# Patient Record
Sex: MALE | Race: WHITE | NOT HISPANIC OR LATINO
[De-identification: names, ages, dates, MRNs, and addresses within clinical notes are randomized per-mention and may not be internally consistent; named-entity substitution may affect disease eponyms.]

---

## 2017-02-14 ENCOUNTER — APPOINTMENT (OUTPATIENT)
Dept: ORTHOPEDIC SURGERY | Facility: CLINIC | Age: 67
End: 2017-02-14

## 2017-02-14 DIAGNOSIS — L84 CORNS AND CALLOSITIES: ICD-10-CM

## 2017-02-15 ENCOUNTER — FORM ENCOUNTER (OUTPATIENT)
Age: 67
End: 2017-02-15

## 2017-02-16 ENCOUNTER — OUTPATIENT (OUTPATIENT)
Dept: OUTPATIENT SERVICES | Facility: HOSPITAL | Age: 67
LOS: 1 days | End: 2017-02-16
Payer: COMMERCIAL

## 2017-02-16 DIAGNOSIS — Z98.89 OTHER SPECIFIED POSTPROCEDURAL STATES: Chronic | ICD-10-CM

## 2017-02-16 PROCEDURE — 20611 DRAIN/INJ JOINT/BURSA W/US: CPT

## 2017-02-16 PROCEDURE — 20611 DRAIN/INJ JOINT/BURSA W/US: CPT | Mod: LT

## 2017-02-16 PROCEDURE — 76882 US LMTD JT/FCL EVL NVASC XTR: CPT

## 2017-02-16 PROCEDURE — 76882 US LMTD JT/FCL EVL NVASC XTR: CPT | Mod: 26,59,LT

## 2017-02-21 ENCOUNTER — OTHER (OUTPATIENT)
Age: 67
End: 2017-02-21

## 2017-02-22 ENCOUNTER — LABORATORY RESULT (OUTPATIENT)
Age: 67
End: 2017-02-22

## 2017-05-05 ENCOUNTER — MEDICATION RENEWAL (OUTPATIENT)
Age: 67
End: 2017-05-05

## 2017-06-27 DIAGNOSIS — J06.9 ACUTE UPPER RESPIRATORY INFECTION, UNSPECIFIED: ICD-10-CM

## 2017-09-05 ENCOUNTER — APPOINTMENT (OUTPATIENT)
Dept: ORTHOPEDIC SURGERY | Facility: CLINIC | Age: 67
End: 2017-09-05
Payer: COMMERCIAL

## 2017-09-05 VITALS — BODY MASS INDEX: 27.4 KG/M2 | RESPIRATION RATE: 16 BRPM | WEIGHT: 225 LBS | HEIGHT: 76 IN

## 2017-09-05 DIAGNOSIS — L40.50 ARTHROPATHIC PSORIASIS, UNSPECIFIED: ICD-10-CM

## 2017-09-05 PROCEDURE — 99214 OFFICE O/P EST MOD 30 MIN: CPT

## 2017-09-05 PROCEDURE — 73130 X-RAY EXAM OF HAND: CPT | Mod: RT

## 2017-09-13 ENCOUNTER — APPOINTMENT (OUTPATIENT)
Dept: ORTHOPEDIC SURGERY | Facility: CLINIC | Age: 67
End: 2017-09-13
Payer: COMMERCIAL

## 2017-09-13 PROCEDURE — 99214 OFFICE O/P EST MOD 30 MIN: CPT | Mod: 25

## 2017-09-13 PROCEDURE — 20600 DRAIN/INJ JOINT/BURSA W/O US: CPT | Mod: RT

## 2017-10-04 ENCOUNTER — MEDICATION RENEWAL (OUTPATIENT)
Age: 67
End: 2017-10-04

## 2017-10-10 ENCOUNTER — OUTPATIENT (OUTPATIENT)
Dept: OUTPATIENT SERVICES | Facility: HOSPITAL | Age: 67
LOS: 1 days | End: 2017-10-10
Payer: COMMERCIAL

## 2017-10-10 DIAGNOSIS — Z98.89 OTHER SPECIFIED POSTPROCEDURAL STATES: Chronic | ICD-10-CM

## 2017-10-10 DIAGNOSIS — M54.2 CERVICALGIA: ICD-10-CM

## 2017-10-10 PROCEDURE — 72050 X-RAY EXAM NECK SPINE 4/5VWS: CPT | Mod: 26

## 2017-10-10 PROCEDURE — 72050 X-RAY EXAM NECK SPINE 4/5VWS: CPT

## 2017-10-11 ENCOUNTER — MEDICATION RENEWAL (OUTPATIENT)
Age: 67
End: 2017-10-11

## 2017-10-11 ENCOUNTER — FORM ENCOUNTER (OUTPATIENT)
Age: 67
End: 2017-10-11

## 2017-10-12 ENCOUNTER — OUTPATIENT (OUTPATIENT)
Dept: OUTPATIENT SERVICES | Facility: HOSPITAL | Age: 67
LOS: 1 days | End: 2017-10-12
Payer: COMMERCIAL

## 2017-10-12 DIAGNOSIS — Z98.89 OTHER SPECIFIED POSTPROCEDURAL STATES: Chronic | ICD-10-CM

## 2017-10-12 PROCEDURE — 72141 MRI NECK SPINE W/O DYE: CPT | Mod: 26

## 2017-10-12 PROCEDURE — 72141 MRI NECK SPINE W/O DYE: CPT

## 2017-11-19 ENCOUNTER — FORM ENCOUNTER (OUTPATIENT)
Age: 67
End: 2017-11-19

## 2017-11-20 ENCOUNTER — OUTPATIENT (OUTPATIENT)
Dept: OUTPATIENT SERVICES | Facility: HOSPITAL | Age: 67
LOS: 1 days | End: 2017-11-20
Payer: COMMERCIAL

## 2017-11-20 DIAGNOSIS — J18.9 PNEUMONIA, UNSPECIFIED ORGANISM: ICD-10-CM

## 2017-11-20 DIAGNOSIS — Z98.89 OTHER SPECIFIED POSTPROCEDURAL STATES: Chronic | ICD-10-CM

## 2017-11-20 PROCEDURE — 71046 X-RAY EXAM CHEST 2 VIEWS: CPT

## 2017-11-20 PROCEDURE — 71020: CPT | Mod: 26

## 2017-11-20 RX ORDER — AZITHROMYCIN 250 MG/1
250 TABLET, FILM COATED ORAL
Qty: 1 | Refills: 1 | Status: DISCONTINUED | COMMUNITY
Start: 2017-06-27 | End: 2017-11-20

## 2017-12-04 ENCOUNTER — FORM ENCOUNTER (OUTPATIENT)
Age: 67
End: 2017-12-04

## 2017-12-05 ENCOUNTER — OUTPATIENT (OUTPATIENT)
Dept: OUTPATIENT SERVICES | Facility: HOSPITAL | Age: 67
LOS: 1 days | End: 2017-12-05
Payer: COMMERCIAL

## 2017-12-05 DIAGNOSIS — Z98.89 OTHER SPECIFIED POSTPROCEDURAL STATES: Chronic | ICD-10-CM

## 2017-12-05 PROCEDURE — 71046 X-RAY EXAM CHEST 2 VIEWS: CPT

## 2017-12-05 PROCEDURE — 71020: CPT | Mod: 26

## 2017-12-19 ENCOUNTER — MEDICATION RENEWAL (OUTPATIENT)
Age: 67
End: 2017-12-19

## 2017-12-19 RX ORDER — LEVOFLOXACIN 500 MG/1
500 TABLET, FILM COATED ORAL DAILY
Qty: 10 | Refills: 0 | Status: DISCONTINUED | COMMUNITY
Start: 2017-11-20 | End: 2017-12-19

## 2018-01-17 ENCOUNTER — FORM ENCOUNTER (OUTPATIENT)
Age: 68
End: 2018-01-17

## 2018-01-18 ENCOUNTER — OUTPATIENT (OUTPATIENT)
Dept: OUTPATIENT SERVICES | Facility: HOSPITAL | Age: 68
LOS: 1 days | End: 2018-01-18
Payer: COMMERCIAL

## 2018-01-18 DIAGNOSIS — Z98.89 OTHER SPECIFIED POSTPROCEDURAL STATES: Chronic | ICD-10-CM

## 2018-01-18 PROCEDURE — 71250 CT THORAX DX C-: CPT | Mod: 26

## 2018-01-18 PROCEDURE — 71250 CT THORAX DX C-: CPT

## 2018-03-19 ENCOUNTER — APPOINTMENT (OUTPATIENT)
Dept: ORTHOPEDIC SURGERY | Facility: CLINIC | Age: 68
End: 2018-03-19
Payer: COMMERCIAL

## 2018-03-19 DIAGNOSIS — R22.31 LOCALIZED SWELLING, MASS AND LUMP, RIGHT UPPER LIMB: ICD-10-CM

## 2018-03-19 DIAGNOSIS — M79.645 PAIN IN LEFT FINGER(S): ICD-10-CM

## 2018-03-19 DIAGNOSIS — G89.29 PAIN IN LEFT FINGER(S): ICD-10-CM

## 2018-03-19 DIAGNOSIS — M79.644 PAIN IN RIGHT FINGER(S): ICD-10-CM

## 2018-03-19 PROCEDURE — 73140 X-RAY EXAM OF FINGER(S): CPT | Mod: FA,F9,F5

## 2018-03-19 PROCEDURE — 76882 US LMTD JT/FCL EVL NVASC XTR: CPT | Mod: RT

## 2018-03-19 PROCEDURE — 99214 OFFICE O/P EST MOD 30 MIN: CPT

## 2018-03-22 ENCOUNTER — MEDICATION RENEWAL (OUTPATIENT)
Age: 68
End: 2018-03-22

## 2018-04-05 ENCOUNTER — FORM ENCOUNTER (OUTPATIENT)
Age: 68
End: 2018-04-05

## 2018-04-05 DIAGNOSIS — R51 HEADACHE: ICD-10-CM

## 2018-04-06 ENCOUNTER — OUTPATIENT (OUTPATIENT)
Dept: OUTPATIENT SERVICES | Facility: HOSPITAL | Age: 68
LOS: 1 days | End: 2018-04-06
Payer: COMMERCIAL

## 2018-04-06 ENCOUNTER — APPOINTMENT (OUTPATIENT)
Dept: CT IMAGING | Facility: HOSPITAL | Age: 68
End: 2018-04-06
Payer: COMMERCIAL

## 2018-04-06 DIAGNOSIS — Z98.89 OTHER SPECIFIED POSTPROCEDURAL STATES: Chronic | ICD-10-CM

## 2018-04-06 PROCEDURE — 70486 CT MAXILLOFACIAL W/O DYE: CPT | Mod: 26

## 2018-04-06 PROCEDURE — 70486 CT MAXILLOFACIAL W/O DYE: CPT

## 2018-05-09 RX ORDER — AZITHROMYCIN 250 MG/1
250 TABLET, FILM COATED ORAL
Qty: 1 | Refills: 1 | Status: DISCONTINUED | COMMUNITY
Start: 2017-12-19 | End: 2018-05-09

## 2018-07-22 ENCOUNTER — FORM ENCOUNTER (OUTPATIENT)
Age: 68
End: 2018-07-22

## 2018-07-23 ENCOUNTER — OUTPATIENT (OUTPATIENT)
Dept: OUTPATIENT SERVICES | Facility: HOSPITAL | Age: 68
LOS: 1 days | End: 2018-07-23
Payer: COMMERCIAL

## 2018-07-23 ENCOUNTER — FORM ENCOUNTER (OUTPATIENT)
Age: 68
End: 2018-07-23

## 2018-07-23 DIAGNOSIS — Z98.89 OTHER SPECIFIED POSTPROCEDURAL STATES: Chronic | ICD-10-CM

## 2018-07-23 PROCEDURE — 70336 MAGNETIC IMAGE JAW JOINT: CPT

## 2018-07-23 PROCEDURE — 70336 MAGNETIC IMAGE JAW JOINT: CPT | Mod: 26

## 2018-07-24 ENCOUNTER — FORM ENCOUNTER (OUTPATIENT)
Age: 68
End: 2018-07-24

## 2018-07-24 ENCOUNTER — OUTPATIENT (OUTPATIENT)
Dept: OUTPATIENT SERVICES | Facility: HOSPITAL | Age: 68
LOS: 1 days | End: 2018-07-24
Payer: COMMERCIAL

## 2018-07-24 DIAGNOSIS — R22.1 LOCALIZED SWELLING, MASS AND LUMP, NECK: ICD-10-CM

## 2018-07-24 DIAGNOSIS — Z98.89 OTHER SPECIFIED POSTPROCEDURAL STATES: Chronic | ICD-10-CM

## 2018-07-24 DIAGNOSIS — K11.9 DISEASE OF SALIVARY GLAND, UNSPECIFIED: ICD-10-CM

## 2018-07-24 PROCEDURE — 70542 MRI ORBIT/FACE/NECK W/DYE: CPT | Mod: 26

## 2018-07-24 PROCEDURE — A9585: CPT

## 2018-07-24 PROCEDURE — 70542 MRI ORBIT/FACE/NECK W/DYE: CPT

## 2018-07-25 ENCOUNTER — RESULT REVIEW (OUTPATIENT)
Age: 68
End: 2018-07-25

## 2018-07-25 ENCOUNTER — OUTPATIENT (OUTPATIENT)
Dept: OUTPATIENT SERVICES | Facility: HOSPITAL | Age: 68
LOS: 1 days | End: 2018-07-25
Payer: COMMERCIAL

## 2018-07-25 ENCOUNTER — FORM ENCOUNTER (OUTPATIENT)
Age: 68
End: 2018-07-25

## 2018-07-25 ENCOUNTER — MEDICATION RENEWAL (OUTPATIENT)
Age: 68
End: 2018-07-25

## 2018-07-25 DIAGNOSIS — Z98.89 OTHER SPECIFIED POSTPROCEDURAL STATES: Chronic | ICD-10-CM

## 2018-07-25 PROCEDURE — 88305 TISSUE EXAM BY PATHOLOGIST: CPT

## 2018-07-25 PROCEDURE — 76942 ECHO GUIDE FOR BIOPSY: CPT | Mod: 26

## 2018-07-25 PROCEDURE — 76942 ECHO GUIDE FOR BIOPSY: CPT

## 2018-07-25 PROCEDURE — 88342 IMHCHEM/IMCYTCHM 1ST ANTB: CPT

## 2018-07-25 PROCEDURE — 10022: CPT

## 2018-07-25 PROCEDURE — 10022: CPT | Mod: LT,59

## 2018-07-25 PROCEDURE — 88173 CYTOPATH EVAL FNA REPORT: CPT

## 2018-07-25 PROCEDURE — 88360 TUMOR IMMUNOHISTOCHEM/MANUAL: CPT

## 2018-07-25 PROCEDURE — 88341 IMHCHEM/IMCYTCHM EA ADD ANTB: CPT

## 2018-07-26 ENCOUNTER — OUTPATIENT (OUTPATIENT)
Dept: OUTPATIENT SERVICES | Facility: HOSPITAL | Age: 68
LOS: 1 days | End: 2018-07-26
Payer: COMMERCIAL

## 2018-07-26 DIAGNOSIS — Z98.89 OTHER SPECIFIED POSTPROCEDURAL STATES: Chronic | ICD-10-CM

## 2018-07-26 LAB
GLUCOSE BLDC GLUCOMTR-MCNC: 103 MG/DL — HIGH (ref 70–99)
NON-GYNECOLOGICAL CYTOLOGY STUDY: SIGNIFICANT CHANGE UP

## 2018-07-26 PROCEDURE — 78815 PET IMAGE W/CT SKULL-THIGH: CPT

## 2018-07-26 PROCEDURE — 78815 PET IMAGE W/CT SKULL-THIGH: CPT | Mod: 26

## 2018-07-26 PROCEDURE — A9552: CPT

## 2018-07-26 PROCEDURE — 82962 GLUCOSE BLOOD TEST: CPT

## 2018-08-08 LAB — NON-GYNECOLOGICAL CYTOLOGY STUDY: SIGNIFICANT CHANGE UP

## 2018-08-31 DIAGNOSIS — R53.81 OTHER MALAISE: ICD-10-CM

## 2018-09-07 ENCOUNTER — APPOINTMENT (OUTPATIENT)
Dept: PULMONOLOGY | Facility: CLINIC | Age: 68
End: 2018-09-07
Payer: COMMERCIAL

## 2018-09-07 VITALS
SYSTOLIC BLOOD PRESSURE: 140 MMHG | DIASTOLIC BLOOD PRESSURE: 70 MMHG | BODY MASS INDEX: 26.54 KG/M2 | HEART RATE: 106 BPM | TEMPERATURE: 98.2 F | OXYGEN SATURATION: 98 % | WEIGHT: 218 LBS

## 2018-09-07 DIAGNOSIS — Z00.00 ENCOUNTER FOR GENERAL ADULT MEDICAL EXAMINATION W/OUT ABNORMAL FINDINGS: ICD-10-CM

## 2018-09-07 DIAGNOSIS — R91.8 OTHER NONSPECIFIC ABNORMAL FINDING OF LUNG FIELD: ICD-10-CM

## 2018-09-07 PROCEDURE — 99215 OFFICE O/P EST HI 40 MIN: CPT | Mod: 25

## 2018-09-07 PROCEDURE — 36415 COLL VENOUS BLD VENIPUNCTURE: CPT

## 2018-09-08 LAB
25(OH)D3 SERPL-MCNC: 21.9 NG/ML
ALBUMIN SERPL ELPH-MCNC: 4.3 G/DL
ALP BLD-CCNC: 76 U/L
ALT SERPL-CCNC: 14 U/L
ANION GAP SERPL CALC-SCNC: 18 MMOL/L
AST SERPL-CCNC: 13 U/L
BASOPHILS # BLD AUTO: 0.03 K/UL
BASOPHILS NFR BLD AUTO: 0.4 %
BILIRUB SERPL-MCNC: 0.4 MG/DL
BUN SERPL-MCNC: 14 MG/DL
CALCIUM SERPL-MCNC: 9.6 MG/DL
CHLORIDE SERPL-SCNC: 101 MMOL/L
CHOLEST SERPL-MCNC: 154 MG/DL
CHOLEST/HDLC SERPL: 2.6 RATIO
CO2 SERPL-SCNC: 22 MMOL/L
CREAT SERPL-MCNC: 1.03 MG/DL
EOSINOPHIL # BLD AUTO: 0.1 K/UL
EOSINOPHIL NFR BLD AUTO: 1.4 %
GLUCOSE SERPL-MCNC: 125 MG/DL
HBA1C MFR BLD HPLC: 4.9 %
HCT VFR BLD CALC: 39.3 %
HDLC SERPL-MCNC: 60 MG/DL
HGB BLD-MCNC: 12.2 G/DL
IMM GRANULOCYTES NFR BLD AUTO: 0.1 %
LDLC SERPL CALC-MCNC: 79 MG/DL
LYMPHOCYTES # BLD AUTO: 1.17 K/UL
LYMPHOCYTES NFR BLD AUTO: 16.8 %
MAN DIFF?: NORMAL
MCHC RBC-ENTMCNC: 29.9 PG
MCHC RBC-ENTMCNC: 31 GM/DL
MCV RBC AUTO: 96.3 FL
MONOCYTES # BLD AUTO: 0.45 K/UL
MONOCYTES NFR BLD AUTO: 6.5 %
NEUTROPHILS # BLD AUTO: 5.19 K/UL
NEUTROPHILS NFR BLD AUTO: 74.8 %
PLATELET # BLD AUTO: 425 K/UL
POTASSIUM SERPL-SCNC: 3.9 MMOL/L
PROT SERPL-MCNC: 6.7 G/DL
PSA SERPL-MCNC: 3.79 NG/ML
RBC # BLD: 4.08 M/UL
RBC # FLD: 13.8 %
SODIUM SERPL-SCNC: 141 MMOL/L
T3 SERPL-MCNC: 96 NG/DL
T4 SERPL-MCNC: 7.6 UG/DL
TRIGL SERPL-MCNC: 75 MG/DL
TSH SERPL-ACNC: 0.79 UIU/ML
WBC # FLD AUTO: 6.95 K/UL

## 2018-09-10 NOTE — HISTORY OF PRESENT ILLNESS
[FreeTextEntry1] : His following after his surgery. [de-identified] : Is doing well. He's walking around without shortness of breath. His feeling back pain in right lower rib cage most likely happen after he lifts heavy objects. He's eating well. Has no problem swallowing. Is not wheezing.

## 2018-09-10 NOTE — ASSESSMENT
[FreeTextEntry1] : Left parotid lymphoepithelial tumor\par The patient underwent left parotid resection with reconstruction surgery including the implant of the left facial nerve.  Patient develop hematoma at the site off the right thigh graft. Patient have dwelling catheter. The patient is scheduled for radiation therapy on the neck followed by chemotherapy.\par \par Chest pain\par \par Is musculoskeletal. Patient had a recent venous Doppler which was negative. The patient is more with ambulation and is improving.\par \par Hypertension\par \par Continue on antihypertensive medication. The blood pressure is controlled.\par \par Sinus tachycardia\par \par It's improving and most likely related to the postoperative course.\par \par Walden's granulomatosis\par \par Follow up with oncology

## 2018-09-10 NOTE — HEALTH RISK ASSESSMENT
[] : No [Two or more falls in past year] : Patient reported two or more falls in the past year [0] : 1) Little interest or pleasure doing things: Not at all (0)

## 2018-09-14 ENCOUNTER — MEDICATION RENEWAL (OUTPATIENT)
Age: 68
End: 2018-09-14

## 2018-11-13 ENCOUNTER — RX CHANGE (OUTPATIENT)
Age: 68
End: 2018-11-13

## 2018-11-13 DIAGNOSIS — G89.18 OTHER ACUTE POSTPROCEDURAL PAIN: ICD-10-CM

## 2018-11-14 ENCOUNTER — RX CHANGE (OUTPATIENT)
Age: 68
End: 2018-11-14

## 2018-11-16 ENCOUNTER — RX RENEWAL (OUTPATIENT)
Age: 68
End: 2018-11-16

## 2018-11-16 ENCOUNTER — APPOINTMENT (OUTPATIENT)
Dept: OTOLARYNGOLOGY | Facility: CLINIC | Age: 68
End: 2018-11-16
Payer: COMMERCIAL

## 2018-11-16 DIAGNOSIS — R68.84 JAW PAIN: ICD-10-CM

## 2018-11-16 PROCEDURE — 99203 OFFICE O/P NEW LOW 30 MIN: CPT

## 2018-11-19 ENCOUNTER — APPOINTMENT (OUTPATIENT)
Dept: NEPHROLOGY | Facility: CLINIC | Age: 68
End: 2018-11-19
Payer: COMMERCIAL

## 2018-11-19 VITALS
WEIGHT: 206 LBS | HEART RATE: 88 BPM | BODY MASS INDEX: 25.08 KG/M2 | SYSTOLIC BLOOD PRESSURE: 168 MMHG | DIASTOLIC BLOOD PRESSURE: 84 MMHG

## 2018-11-19 PROBLEM — R68.84 PAIN IN MANDIBLE: Status: ACTIVE | Noted: 2018-11-16

## 2018-11-19 PROCEDURE — 99204 OFFICE O/P NEW MOD 45 MIN: CPT | Mod: 25

## 2018-11-19 PROCEDURE — 93784 AMBL BP MNTR W/SOFTWARE: CPT

## 2018-11-21 ENCOUNTER — APPOINTMENT (OUTPATIENT)
Dept: INFECTIOUS DISEASE | Facility: CLINIC | Age: 68
End: 2018-11-21
Payer: COMMERCIAL

## 2018-11-21 VITALS
BODY MASS INDEX: 25.43 KG/M2 | HEART RATE: 96 BPM | RESPIRATION RATE: 15 BRPM | SYSTOLIC BLOOD PRESSURE: 166 MMHG | OXYGEN SATURATION: 100 % | DIASTOLIC BLOOD PRESSURE: 81 MMHG | TEMPERATURE: 98.1 F | WEIGHT: 211 LBS | HEIGHT: 76.5 IN

## 2018-11-21 DIAGNOSIS — M31.30 WEGENER'S GRANULOMATOSIS W/OUT RENAL INVOLVEMENT: ICD-10-CM

## 2018-11-21 DIAGNOSIS — K04.7 PERIAPICAL ABSCESS W/OUT SINUS: ICD-10-CM

## 2018-11-21 DIAGNOSIS — N17.9 ACUTE KIDNEY FAILURE, UNSPECIFIED: ICD-10-CM

## 2018-11-21 PROCEDURE — 99204 OFFICE O/P NEW MOD 45 MIN: CPT

## 2018-11-21 RX ORDER — OXYCODONE AND ACETAMINOPHEN 5; 325 MG/1; MG/1
5-325 TABLET ORAL EVERY 6 HOURS
Qty: 64 | Refills: 0 | Status: COMPLETED | COMMUNITY
Start: 2018-11-13 | End: 2018-11-21

## 2018-11-26 ENCOUNTER — MEDICATION RENEWAL (OUTPATIENT)
Age: 68
End: 2018-11-26

## 2018-11-26 LAB
25(OH)D3 SERPL-MCNC: 22.7 NG/ML
ALBUMIN SERPL ELPH-MCNC: 4.7 G/DL
ALDOSTERONE SERUM: 18.5 NG/DL
ALP BLD-CCNC: 68 U/L
ALT SERPL-CCNC: 15 U/L
ANION GAP SERPL CALC-SCNC: 13 MMOL/L
APPEARANCE: CLEAR
AST SERPL-CCNC: 14 U/L
BACTERIA: NEGATIVE
BASOPHILS # BLD AUTO: 0.03 K/UL
BASOPHILS NFR BLD AUTO: 0.4 %
BILIRUB SERPL-MCNC: 0.3 MG/DL
BILIRUBIN URINE: NEGATIVE
BLOOD URINE: NEGATIVE
BUN SERPL-MCNC: 15 MG/DL
CALCIUM SERPL-MCNC: 9.7 MG/DL
CALCIUM SERPL-MCNC: 9.7 MG/DL
CHLORIDE SERPL-SCNC: 100 MMOL/L
CO2 SERPL-SCNC: 27 MMOL/L
COLOR: YELLOW
CREAT SERPL-MCNC: 1.25 MG/DL
CREAT SPEC-SCNC: 48 MG/DL
CREAT/PROT UR: 0.1 RATIO
EOSINOPHIL # BLD AUTO: 0.05 K/UL
EOSINOPHIL NFR BLD AUTO: 0.7 %
GLUCOSE QUALITATIVE U: NEGATIVE MG/DL
GLUCOSE SERPL-MCNC: 97 MG/DL
HCT VFR BLD CALC: 37.5 %
HGB BLD-MCNC: 12 G/DL
IMM GRANULOCYTES NFR BLD AUTO: 0.1 %
KETONES URINE: NEGATIVE
LEUKOCYTE ESTERASE URINE: NEGATIVE
LYMPHOCYTES # BLD AUTO: 0.89 K/UL
LYMPHOCYTES NFR BLD AUTO: 12.7 %
MAGNESIUM SERPL-MCNC: 2 MG/DL
MAN DIFF?: NORMAL
MCHC RBC-ENTMCNC: 27.8 PG
MCHC RBC-ENTMCNC: 32 GM/DL
MCV RBC AUTO: 87 FL
MICROSCOPIC-UA: NORMAL
MONOCYTES # BLD AUTO: 0.57 K/UL
MONOCYTES NFR BLD AUTO: 8.2 %
NEUTROPHILS # BLD AUTO: 5.44 K/UL
NEUTROPHILS NFR BLD AUTO: 77.9 %
NITRITE URINE: NEGATIVE
PARATHYROID HORMONE INTACT: 54 PG/ML
PH URINE: 6
PHOSPHATE SERPL-MCNC: 4.4 MG/DL
PLATELET # BLD AUTO: 315 K/UL
POTASSIUM SERPL-SCNC: 4 MMOL/L
PROT SERPL-MCNC: 7.1 G/DL
PROT UR-MCNC: 5 MG/DL
PROTEIN URINE: NEGATIVE MG/DL
RBC # BLD: 4.31 M/UL
RBC # FLD: 14.9 %
RED BLOOD CELLS URINE: 0 /HPF
SODIUM SERPL-SCNC: 140 MMOL/L
SPECIFIC GRAVITY URINE: 1.01
SQUAMOUS EPITHELIAL CELLS: 0 /HPF
T4 FREE SERPL-MCNC: 1.4 NG/DL
TSH SERPL-ACNC: 0.47 UIU/ML
URATE SERPL-MCNC: 6.7 MG/DL
UROBILINOGEN URINE: NEGATIVE MG/DL
WBC # FLD AUTO: 6.99 K/UL
WHITE BLOOD CELLS URINE: 0 /HPF

## 2018-12-02 LAB — RENIN ACTIVITY, PLASMA: 1.68 NG/ML/HR

## 2019-01-24 ENCOUNTER — APPOINTMENT (OUTPATIENT)
Dept: ORTHOPEDIC SURGERY | Facility: CLINIC | Age: 69
End: 2019-01-24
Payer: COMMERCIAL

## 2019-01-24 DIAGNOSIS — M67.911 UNSPECIFIED DISORDER OF SYNOVIUM AND TENDON, RIGHT SHOULDER: ICD-10-CM

## 2019-01-24 DIAGNOSIS — M75.41 IMPINGEMENT SYNDROME OF RIGHT SHOULDER: ICD-10-CM

## 2019-01-24 PROCEDURE — 73030 X-RAY EXAM OF SHOULDER: CPT | Mod: RT

## 2019-01-24 PROCEDURE — 99214 OFFICE O/P EST MOD 30 MIN: CPT

## 2019-01-24 NOTE — ASSESSMENT
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 He he should not be taking many NSAIDs now because recently his creatinine had been elevated related to chemotherapy for the parotid carcinoma.\par He can take Tylenol if needed for pain. I suggested going for diagnostic ultrasound to assess the rotator cuff and at the same time an aspiration/needling of the calcific density can be performed with a corticosteroid injection. Hopefully this will give him pain relief. He can work on the range of motion doing assisted motion and stretching of the shoulder and gentle strengthening around the shoulder. No overhead strengthening such as shoulder press and no bench press.\par Heat and ice as needed.\par If the pain is not better following the ultrasound injection then I may have him get an MRI to assess further and he should followup with the MRI.

## 2019-01-24 NOTE — HISTORY OF PRESENT ILLNESS
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 He was seen 2 years ago with LEFT shoulder pain which got better with corticosteroid injection.\par He developed RIGHT shoulder pain several mos ago without any injury or inciting event.\par He had been guarding it because it wasn't that severe and then he had a LEFT parotid carcinoma which required surgery and radiation therapy. He is now done with all of the treatment and recovering well..\par the shoulder pain is intermittent but consistent when lifts his arm and does a Bowen type of impingement movement. During surgery holding his arm out to the side can be painful.\par He sleeps on the right side may aggravate it . He wakes with pain.\par His creatinine had been elevated so he can't take NSAIDs. He hasn't been taking Tylenol or medication for the pain because he gets relief with his arm at the side.\par He has a history of calcific tendinitis in the LEFT shoulder treated with surgery many years ago which did well. He had 2 corticosteroid injections done prior to the surgery.

## 2019-01-24 NOTE — PHYSICAL EXAM
[de-identified] : Right  shoulder w/ left for comparison:\par Normal appearance. \par AROM: 170 FE, IR to T 11 vs T9, 170 abd.\par PROM: 170 FE, 60 ER at the side vs 70, 80 ER and 0 IR vs 60 in the 90 degree abducted position.\par Motor:  5/5  supraspinatus,  5/5 ER, 5/5 IR, 5/5 biceps, 5/5 deltoid.  Normal lift off test\par + Neer,+  Bowen. -  X-arm.   - [place]'s, + Speeds.\par Tender over the biceps tendon and the anterior shoulder.  \par Laxity: normal.\par No scapular winging.\par Sensation is intact distally in the UE.\par Skin is intact in the UE. \par Intact Motor distally.\par  [de-identified] : LEFT facial neuropraxia [de-identified] : \par X-rays of the RIGHT shoulder today 3 views show 2 large oval calcific densities just superior to the greater tuberosity in the region of the supraspinatus tendon consistent with a calcific tendinitis. Very small glenohumeral spur without significant osteoarthritis. No bone lesions.

## 2019-02-06 ENCOUNTER — APPOINTMENT (OUTPATIENT)
Dept: NEUROLOGY | Facility: CLINIC | Age: 69
End: 2019-02-06
Payer: COMMERCIAL

## 2019-02-06 VITALS
HEART RATE: 74 BPM | TEMPERATURE: 97.9 F | SYSTOLIC BLOOD PRESSURE: 153 MMHG | BODY MASS INDEX: 25.43 KG/M2 | DIASTOLIC BLOOD PRESSURE: 84 MMHG | HEIGHT: 76.5 IN | OXYGEN SATURATION: 100 % | WEIGHT: 211 LBS

## 2019-02-06 DIAGNOSIS — M25.522 PAIN IN LEFT ELBOW: ICD-10-CM

## 2019-02-06 DIAGNOSIS — G56.22 LESION OF ULNAR NERVE, LEFT UPPER LIMB: ICD-10-CM

## 2019-02-06 DIAGNOSIS — R20.0 ANESTHESIA OF SKIN: ICD-10-CM

## 2019-02-06 PROCEDURE — 99203 OFFICE O/P NEW LOW 30 MIN: CPT

## 2019-02-06 PROCEDURE — 76882 US LMTD JT/FCL EVL NVASC XTR: CPT | Mod: LT

## 2019-02-06 PROCEDURE — 95911 NRV CNDJ TEST 9-10 STUDIES: CPT

## 2019-02-06 NOTE — PROCEDURE
[FreeTextEntry1] : Nerve Conduction, Electromyography and Neuromuscular Ultrasound Report [FreeTextEntry3] : Electro Physiologic Findings:\par \par Limb temperature was monitored and maintained at approximately 32 – 36° C in the upper extremities.\par \par The median distal sensory short segments had mildly reduced conduction velocity; otherwise the median sensory and motor responses were unremarkable without slowing across the wrists. The left ulnar sensory response was mildly slow with normal amplitude; otherwise the ulnar sensory and motor responses were also normal; there was no motor slowing or conduction block across the elbows. 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 There was no definite median or ulnar nerve entrapment on either side. However, the left ulnar nerve was significantly enlarged at the elbow, with a hypoechoic appearance and reduced fascicular pattern, indicative of mild entrapment. \par

## 2019-02-06 NOTE — HISTORY OF PRESENT ILLNESS
[FreeTextEntry1] : 67 yo M with hand numbness for the past 3-4 months, after starting platinum-based chemotherapy for head and neck cancer. He's had occasional "sand-paper" feeling in the hands in the past, especially pinky fingers when his elbows are flexed, no motor symptoms in the past. Symptoms have gotten worse in the past 2 weeks or so, especially left medial hand and 4th and 5th fingers, although also involving the other fingers and the palm. \par \par He has a history of Wegener's granulomatosis, had an EMG 5-6 years ago which showed some sensory neuropathy, and was started on rituximab (last in March 2018). He still has some numbness and tingling in his feet which does not bother him much. \par \par 10 pt review of systems performed and reviewed with patient\par Past medical history, surgical history, social history, and family history reviewed with patient\par See scanned document for details

## 2019-02-06 NOTE — PHYSICAL EXAM
[General Appearance - Alert] : alert [General Appearance - In No Acute Distress] : in no acute distress [Oriented To Time, Place, And Person] : oriented to person, place, and time [Impaired Insight] : insight and judgment were intact [Affect] : the affect was normal [Concentration Intact] : normal concentrating ability [Fluency] : fluency intact [Comprehension] : comprehension intact [Past History] : adequate knowledge of personal past history [Vocabulary] : adequate range of vocabulary [Motor Tone] : muscle tone was normal in all four extremities [Motor Strength] : muscle strength was normal in all four extremities [Involuntary Movements] : no involuntary movements were seen [No Muscle Atrophy] : normal bulk in all four extremities [Sensation Tactile Decrease] : light touch was intact [Sensation Pain / Temperature Decrease] : pain and temperature was intact [Romberg's Sign] : Romberg's sign was negtive [Abnormal Walk] : normal gait [1+] : Brachioradialis left 1+ [2+] : Patella left 2+ [0] : Ankle jerk left 0 [FreeTextEntry5] : left facial weakness (post surgical) [FreeTextEntry1] : Tinel's sign + at left elbow

## 2019-02-06 NOTE — HISTORY OF PRESENT ILLNESS
[FreeTextEntry1] : 69 yo M with hand numbness for the past 3-4 months, after starting platinum-based chemotherapy for head and neck cancer. He's had occasional "sand-paper" feeling in the hands in the past, especially pinky fingers when his elbows are flexed, no motor symptoms in the past. Symptoms have gotten worse in the past 2 weeks or so, especially left medial hand and 4th and 5th fingers, although also involving the other fingers and the palm. \par \par He has a history of Wegener's granulomatosis, had an EMG 5-6 years ago which showed some sensory neuropathy, and was started on rituximab (last in March 2018). He still has some numbness and tingling in his feet which does not bother him much. \par \par 10 pt review of systems performed and reviewed with patient\par Past medical history, surgical history, social history, and family history reviewed with patient\par See scanned document for details

## 2019-02-06 NOTE — ASSESSMENT
[FreeTextEntry1] : Symptoms most likely due to mild sensory predominant chemotherapy induced neuropathy given time course and NCS findings. No significant median or ulnar nerve entrapment on NCS however enlarged left ulnar nerve at elbow indicates some degre of compression. Recommend regular nocturnal elbow bracing. Can repeat study if symptoms worsen over the course of the next few months. \par \par See separate procedure note for full results of study.\par

## 2019-02-13 ENCOUNTER — OTHER (OUTPATIENT)
Age: 69
End: 2019-02-13

## 2019-02-14 ENCOUNTER — FORM ENCOUNTER (OUTPATIENT)
Age: 69
End: 2019-02-14

## 2019-02-15 ENCOUNTER — OUTPATIENT (OUTPATIENT)
Dept: OUTPATIENT SERVICES | Facility: HOSPITAL | Age: 69
LOS: 1 days | End: 2019-02-15
Payer: COMMERCIAL

## 2019-02-15 ENCOUNTER — APPOINTMENT (OUTPATIENT)
Dept: ULTRASOUND IMAGING | Facility: HOSPITAL | Age: 69
End: 2019-02-15
Payer: COMMERCIAL

## 2019-02-15 DIAGNOSIS — Z98.89 OTHER SPECIFIED POSTPROCEDURAL STATES: Chronic | ICD-10-CM

## 2019-02-15 PROCEDURE — 20611 DRAIN/INJ JOINT/BURSA W/US: CPT

## 2019-02-15 PROCEDURE — 20611 DRAIN/INJ JOINT/BURSA W/US: CPT | Mod: RT

## 2019-02-18 LAB
ALBUMIN SERPL ELPH-MCNC: 4.1 G/DL
ALBUMIN SERPL ELPH-MCNC: 4.4 G/DL
ALP BLD-CCNC: 69 U/L
ALP BLD-CCNC: 70 U/L
ALT SERPL-CCNC: 13 U/L
ALT SERPL-CCNC: 17 U/L
ANION GAP SERPL CALC-SCNC: 11 MMOL/L
ANION GAP SERPL CALC-SCNC: 11 MMOL/L
AST SERPL-CCNC: 15 U/L
AST SERPL-CCNC: 18 U/L
BILIRUB SERPL-MCNC: 0.4 MG/DL
BILIRUB SERPL-MCNC: 0.4 MG/DL
BUN SERPL-MCNC: 13 MG/DL
BUN SERPL-MCNC: 15 MG/DL
CALCIUM SERPL-MCNC: 9.6 MG/DL
CALCIUM SERPL-MCNC: 9.6 MG/DL
CHLORIDE SERPL-SCNC: 101 MMOL/L
CHLORIDE SERPL-SCNC: 101 MMOL/L
CO2 SERPL-SCNC: 29 MMOL/L
CO2 SERPL-SCNC: 30 MMOL/L
CREAT SERPL-MCNC: 1.32 MG/DL
CREAT SERPL-MCNC: 1.42 MG/DL
GLUCOSE SERPL-MCNC: 75 MG/DL
GLUCOSE SERPL-MCNC: 94 MG/DL
POTASSIUM SERPL-SCNC: 4.3 MMOL/L
POTASSIUM SERPL-SCNC: 5 MMOL/L
PROT SERPL-MCNC: 6.6 G/DL
PROT SERPL-MCNC: 6.7 G/DL
SODIUM SERPL-SCNC: 141 MMOL/L
SODIUM SERPL-SCNC: 142 MMOL/L

## 2019-02-28 ENCOUNTER — RX RENEWAL (OUTPATIENT)
Age: 69
End: 2019-02-28

## 2019-11-20 ENCOUNTER — OTHER (OUTPATIENT)
Age: 69
End: 2019-11-20

## 2019-11-20 LAB
BASOPHILS NFR BLD AUTO: 0.4 %
EOSINOPHIL NFR BLD AUTO: 0.9 %
HCT VFR BLD CALC: 37 %
HGB BLD-MCNC: 11.9 G/DL
LYMPHOCYTES NFR BLD AUTO: 12.7 %
MAN DIFF?: NO
MCHC RBC-ENTMCNC: 29.8 PG
MCHC RBC-ENTMCNC: 32.2 G/DL
MCV RBC AUTO: 92.7 FL
MONOCYTES NFR BLD AUTO: 7.6 %
NEUTROPHILS NFR BLD AUTO: 78.4 %
PLATELET # BLD AUTO: 513 K/UL
RBC # BLD: 3.99 M/UL
RBC # FLD: 13.5 %
WBC # FLD AUTO: 10.6 K/UL

## 2019-12-05 ENCOUNTER — RX RENEWAL (OUTPATIENT)
Age: 69
End: 2019-12-05

## 2019-12-11 ENCOUNTER — FORM ENCOUNTER (OUTPATIENT)
Age: 69
End: 2019-12-11

## 2019-12-12 ENCOUNTER — OUTPATIENT (OUTPATIENT)
Dept: OUTPATIENT SERVICES | Facility: HOSPITAL | Age: 69
LOS: 1 days | End: 2019-12-12
Payer: COMMERCIAL

## 2019-12-12 DIAGNOSIS — Z98.89 OTHER SPECIFIED POSTPROCEDURAL STATES: Chronic | ICD-10-CM

## 2019-12-12 DIAGNOSIS — R10.11 RIGHT UPPER QUADRANT PAIN: ICD-10-CM

## 2019-12-12 PROCEDURE — 74183 MRI ABD W/O CNTR FLWD CNTR: CPT

## 2019-12-12 PROCEDURE — 74183 MRI ABD W/O CNTR FLWD CNTR: CPT | Mod: 26

## 2019-12-12 PROCEDURE — 82962 GLUCOSE BLOOD TEST: CPT

## 2019-12-12 PROCEDURE — A9552: CPT

## 2019-12-12 PROCEDURE — A9585: CPT

## 2019-12-12 PROCEDURE — 78815 PET IMAGE W/CT SKULL-THIGH: CPT | Mod: 26

## 2019-12-12 PROCEDURE — 78815 PET IMAGE W/CT SKULL-THIGH: CPT

## 2020-01-06 ENCOUNTER — RX RENEWAL (OUTPATIENT)
Age: 70
End: 2020-01-06

## 2020-01-06 DIAGNOSIS — C78.00 SECONDARY MALIGNANT NEOPLASM OF UNSPECIFIED LUNG: ICD-10-CM

## 2020-01-09 ENCOUNTER — FORM ENCOUNTER (OUTPATIENT)
Age: 70
End: 2020-01-09

## 2020-01-10 ENCOUNTER — OUTPATIENT (OUTPATIENT)
Dept: OUTPATIENT SERVICES | Facility: HOSPITAL | Age: 70
LOS: 1 days | End: 2020-01-10
Payer: COMMERCIAL

## 2020-01-10 DIAGNOSIS — Z98.89 OTHER SPECIFIED POSTPROCEDURAL STATES: Chronic | ICD-10-CM

## 2020-01-10 PROCEDURE — 78815 PET IMAGE W/CT SKULL-THIGH: CPT

## 2020-01-10 PROCEDURE — A9587: CPT

## 2020-01-10 PROCEDURE — 78815 PET IMAGE W/CT SKULL-THIGH: CPT | Mod: 26

## 2020-01-14 ENCOUNTER — APPOINTMENT (OUTPATIENT)
Dept: INTERVENTIONAL RADIOLOGY/VASCULAR | Facility: HOSPITAL | Age: 70
End: 2020-01-14
Payer: COMMERCIAL

## 2020-01-14 VITALS — SYSTOLIC BLOOD PRESSURE: 130 MMHG | DIASTOLIC BLOOD PRESSURE: 85 MMHG | RESPIRATION RATE: 16 BRPM | HEART RATE: 76 BPM

## 2020-01-14 DIAGNOSIS — Z85.05 PERSONAL HISTORY OF MALIGNANT NEOPLASM OF LIVER: ICD-10-CM

## 2020-01-14 PROCEDURE — 99214 OFFICE O/P EST MOD 30 MIN: CPT

## 2020-01-14 PROCEDURE — 99204 OFFICE O/P NEW MOD 45 MIN: CPT

## 2020-01-14 NOTE — PHYSICAL EXAM
[Alert] : alert [No Acute Distress] : no acute distress [Well Developed] : well developed [Well Nourished] : well nourished [Normal Sclera/Conjunctiva] : normal sclera/conjunctiva [No Neck Mass] : no neck mass was observed [Normal Oropharynx] : the oropharynx was normal [No Thyroid Nodules] : there were no palpable thyroid nodules [No Respiratory Distress] : no respiratory distress [Clear to Auscultation] : lungs were clear to auscultation bilaterally [Normal S1, S2] : normal S1 and S2 [Normal Rate] : heart rate was normal  [Regular Rhythm] : with a regular rhythm [Femoral Arteries Normal] : femoral pulses were normal without bruits [No Edema] : there was no peripheral edema [Normal Bowel Sounds] : normal bowel sounds [Soft] : abdomen soft [Not Distended] : not distended [Normal Anterior Cervical Nodes] : anterior cervical nodes [No Spinal Tenderness] : no spinal tenderness [Spine Straight] : spine straight [Normal Strength/Tone] : muscle strength and tone were normal [Normal Gait] : normal gait [Acanthosis Nigricans___] : no acanthosis nigricans [No Rash] : no rash [Oriented x3] : oriented to person, place, and time [No Tremors] : no tremors [Normal Mood] : the mood was normal [Normal Affect] : the affect was normal [de-identified] : Left neck incision, mild left facial droop [Fully active, able to carry on all pre-disease performance without restriction] : Fully active, able to carry on all pre-disease performance without restriction

## 2020-01-14 NOTE — HISTORY OF PRESENT ILLNESS
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  He underwent radical neck dissection and six weeks of radiation therapy at [facility] in [facility].  At the time of radiation therapy had two doses of cisplatin which was stopped due to elevation of creatinine.\par \par In 12/2019 PET CT and MR discovered liver disease which was biopsied at [facility] and consistent with metastatic disease of his parotid primary tumor.\par \par Seen in consultation for possible local regional therapy with Yttrium 90 radioembolization

## 2020-01-14 NOTE — REVIEW OF SYSTEMS
[Negative] : Heme/Lymph [FreeTextEntry4] : left facial numbness [FreeTextEntry7] : occasional right sided abdominal discomfort

## 2020-01-14 NOTE — DATA REVIEWED
[FreeTextEntry1] : MRI and PET CT shows hepatic masses in the right lobe and segment 4 of the left lobe.  Question of small area of FDG uptake in segment 2.  Abby-portal and portocaval adenopathy.

## 2020-01-14 NOTE — ASSESSMENT
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  Being evaluatied by Dr Cruz at [facility] for possible therapy.  At this time given the amount of liver disease, liver function and known radiosensitivity of the tumor he IS a candidate for yttrium 90 hepatic radioembolization

## 2020-01-14 NOTE — CONSULT LETTER
[Dear  ___] : Dear  [unfilled], [Consult Closing:] : Thank you very much for allowing me to participate in the care of this patient.  If you have any questions, please do not hesitate to contact me. [Please see my note below.] : Please see my note below. [Consult Letter:] : I had the pleasure of evaluating your patient, [unfilled]. [FreeTextEntry3] : Dennis Hutson MD, FSIR\par Chief Interventional Radiology\par [facility]\par [facility]\par  [Sincerely,] : Sincerely,

## 2020-01-27 ENCOUNTER — APPOINTMENT (OUTPATIENT)
Dept: INTERVENTIONAL RADIOLOGY/VASCULAR | Facility: HOSPITAL | Age: 70
End: 2020-01-27

## 2020-02-03 ENCOUNTER — APPOINTMENT (OUTPATIENT)
Dept: INTERVENTIONAL RADIOLOGY/VASCULAR | Facility: HOSPITAL | Age: 70
End: 2020-02-03

## 2020-04-25 ENCOUNTER — MESSAGE (OUTPATIENT)
Age: 70
End: 2020-04-25

## 2020-06-11 RX ORDER — DIAZEPAM 5 MG/1
5 TABLET ORAL
Qty: 90 | Refills: 0 | Status: ACTIVE | COMMUNITY
Start: 2017-05-05 | End: 1900-01-01

## 2021-12-10 ENCOUNTER — APPOINTMENT (OUTPATIENT)
Dept: HEART AND VASCULAR | Facility: CLINIC | Age: 71
End: 2021-12-10
Payer: COMMERCIAL

## 2021-12-10 VITALS
DIASTOLIC BLOOD PRESSURE: 95 MMHG | SYSTOLIC BLOOD PRESSURE: 170 MMHG | OXYGEN SATURATION: 98 % | HEIGHT: 76.5 IN | WEIGHT: 210 LBS | HEART RATE: 88 BPM | BODY MASS INDEX: 25.31 KG/M2 | TEMPERATURE: 97.8 F

## 2021-12-10 PROCEDURE — 99205 OFFICE O/P NEW HI 60 MIN: CPT

## 2021-12-12 NOTE — REASON FOR VISIT
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

## 2021-12-12 NOTE — HISTORY OF PRESENT ILLNESS
[FreeTextEntry1] : 71 M with PMHx of Graves disease, psoriatic arthritis, granulomatosis with polyangiitis (2012; lung and upper airways involvement, not renal) on rituximab, recurrent pneumonia s/p RUL segmentectomy (granuloma), recurrent/metastatic lymphoepithelial carcinoma (dx 2018) presents today for evaluation and management of HTN. \par \par In 2014, he developed bilateral PNA and ultimately underwent CT scan as well as PET- CT scan which demonstrated the development of a progressively slow growling right upper lobe nodule which had increased SUV activity=12. He is s/p right VATS, robotic assist anterior segmentectomy, wedge resection, right upper lobe.\par \par \par He had initially developed left jaw pain ~11/2017 with pain in fingers, which he attributed to his psoriatic arthritis, and resolved with adalimumab. However, his symptoms persisted. Had MRI in 7/2018 that showed parotid mass with perineural spread. On 8/17/18, he underwent left parotidectomy, mastoidectomy, facial nerve sacrifice, ALT free flap reconstruction, cable graft, facial sling. He had positive L cervical LNs (12/52 per Dr. Alberto), clean surgical margins and no distant mets. He was treated with two doses of cisplatin – last 9/26 – was d/ace b/o JEET (Cr 1.7 per Dr. Alberto). He also received RT starting end of 9/2018.\par \par In 12/2019 PET CT and MR discovered liver disease which was biopsied at [facility] and consistent with metastatic disease of his parotid primary tumor.\par \par Seen in consultation for possible local regional therapy with Yttrium 90 radioembolization.\par \par He goes to [facility] every 3 weeks for immunotherapy w/ Keytruda/pembrolizumab. \par \par He reports systolic HTN and very labile BP since med school. He often had normal pressures when he would check at home (usually 130s/75), and elevated at doctor's visits. He was taking another ARB he thinks w/ a beta blocker about 10 years ago and was feeling dizzy/lightheaded when working out at the gym. \par \par During his cancer treatments his BP would be elevated at visits for treatment. \par \par He started noticing elevated BP around 8/2018, was on NSAIDs but persisted when off, was on Losartan 50mg QD. Had w/u w/ nephrologist including renin,benito and ABPM - showed nighttime dipping present but mean daytime and 24 hour SBPs were high - plan was to restart anti-HTN meds. He ultimately felt dizziness on the Losartan and orthostatic hypotension feeling lightheaded and dizzy upon standing (no room spinning) and self d/c'd. He worked on dietary/lifestyle changes instead and felt his BPs were well controlled for a long time. \par \par He feels they are now elevated at home as well as the doctor's office and is interested in starting treatment again.\par \par He was taken off the rituximab about a year and a half ago 2/2 to concerns over COVID pandemic as well as interfering w/ the pembrolizumab treatment after liver mets were discovered. \par \par TTE 2014 - RV and LV normal size and function; LVEF 60-65%; no significant valvular disease \par \par \par He has family h/o of HTN in both his parents, but no heart disease or stroke.\par \par He reports having well-controlled lipids his whole life (LDL was 79 in 2018). \par \par Recent BMP: \par Cr 1.2\par BUN 21\par K+ 4.0\par

## 2021-12-12 NOTE — DISCUSSION/SUMMARY
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 \par \par HTN - we want to avoid any undesirable side effects from ACEi, so will try him on a lower dosage of Losartan at 25mg daily and assess his response. He will have blood work at [facility] in 3 weeks, and will send us BMP results afterwards. Ordered echo to assess for any structural functional abnormalities.\par \par Will f/u after labs and echo.

## 2021-12-30 LAB — SARS-COV-2 N GENE NPH QL NAA+PROBE: NOT DETECTED

## 2022-01-04 RX ORDER — CHLORHEXIDINE GLUCONATE, 0.12% ORAL RINSE 1.2 MG/ML
0.12 SOLUTION DENTAL
Qty: 1 | Refills: 0 | Status: COMPLETED | COMMUNITY
Start: 2018-11-16 | End: 2022-01-04

## 2022-01-04 RX ORDER — AMOXICILLIN AND CLAVULANATE POTASSIUM 875; 125 MG/1; MG/1
875-125 TABLET, COATED ORAL
Qty: 20 | Refills: 0 | Status: COMPLETED | COMMUNITY
Start: 2018-11-16 | End: 2022-01-04

## 2022-01-06 ENCOUNTER — APPOINTMENT (OUTPATIENT)
Dept: ORTHOPEDIC SURGERY | Facility: CLINIC | Age: 72
End: 2022-01-06
Payer: COMMERCIAL

## 2022-01-06 DIAGNOSIS — M75.42 IMPINGEMENT SYNDROME OF LEFT SHOULDER: ICD-10-CM

## 2022-01-06 DIAGNOSIS — M75.31 CALCIFIC TENDINITIS OF RIGHT SHOULDER: ICD-10-CM

## 2022-01-06 PROCEDURE — 73030 X-RAY EXAM OF SHOULDER: CPT | Mod: LT

## 2022-01-06 PROCEDURE — 20610 DRAIN/INJ JOINT/BURSA W/O US: CPT | Mod: LT

## 2022-01-06 PROCEDURE — 99214 OFFICE O/P EST MOD 30 MIN: CPT | Mod: 25

## 2022-01-06 NOTE — PROCEDURE
[Injection] : Injection [Left] : of the left [Subacromial Bursa] : subacromial bursa [Inflammation] : inflammation [Bleeding] : bleeding [Infection] : infection [Allergic Reaction] : allergic reaction [Patient] : patient [Risk] : risk [Benefits] : benefits [Alternatives] : alternatives [Verbal Consent Obtained] : verbal consent was obtained prior to the procedure [Alcohol] : alcohol [Betadine] : betadine [Ethyl Chloride Spray] : ethyl chloride spray was used as a topical anesthetic [Posterior] : posterior [Same Needle As Aspiration/New Syringe] : the syringe was changed and the same needle was left in place and [1% Lidocaine___(mL)] : [unfilled] mL of 1% Lidocaine [Triamcinolone 40mg/mL___(mL)] : [unfilled] ~UmL of 40mg/mL triamcinolone [Bandage Applied] : a bandage [Tolerated Well] : The patient tolerated the procedure well [None] : none [No Strenuous Activity___day(s)] : avoid strenuous activity for [unfilled] day(s) [PRN] : PRN

## 2022-01-07 NOTE — HISTORY OF PRESENT ILLNESS
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  He has pain putting on a coat and reaching for objects and at night when sleeping.  He cannot take NSAIDs because his creatinine went up to about 1.2 from 1.6 so he is careful with his kidneys.  He receives immunotherapy for metastatic cancer every 3 weeks which he just had about 1 week ago.  He did have metastases to his liver and bone.  Bone metastases were to his ilium, acetabulum and thoracic vertebrae his September PET scan however was clean.\par He takes occasional oxycodone if he has severe pain but nothing consistently.  He is working and was doing surgery today.\par I had seen him for his right shoulder 2 years ago and for the left shoulder 4 years ago

## 2022-01-07 NOTE — ASSESSMENT
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  This is the shoulder where he had a rotator cuff repair and repair of the biceps tendon many years ago.  We had done a steroid injection in about 4 5 years ago.  He has recent pain again that is consistent with rotator cuff tendinopathy or impingement.  He has had bone metastases but none have been in the upper extremities and x-rays today did not show any evidence.  We decided to try a steroid injection to see if this alleviates his pain.  He really cannot take any consistent amount of NSAIDs given his kidney function.\par If the pain does not resolve I would recommend going to formal physical therapy and getting an MRI.  He was also given home exercises and surgical tubing band.\par If he gets an MRI we will call him and otherwise follow-up as needed, 6 to 8 weeks if its not better

## 2022-01-07 NOTE — PHYSICAL EXAM
[UE] : Sensory: Intact in bilateral upper extremities [Normal RUE] : Right Upper Extremity: No scars, rashes, lesions, ulcers, skin intact [Normal LUE] : Left Upper Extremity: No scars, rashes, lesions, ulcers, skin intact [Normal Touch] : sensation intact for touch [Normal] : Gait: normal [de-identified] : Left shoulder with right for comparison\par Normal appearance. \par AROM: 180 FE with pain on the left but not the right, IR to T 11 vs T9, 170 abd.\par PROM: 170 FE, 60 ER at the side vs 70, 80 ER and 10 IR vs 60 in the 90 degree abducted position.\par Motor:  5/5  supraspinatus,  5/5 ER, 5/5 IR, 5/5 biceps, 5/5 deltoid.  Normal lift off test\par + Neer,+  Bowen. + X-arm.   + [place]'s, + Speeds.\par Nontender around the left shoulder\par Laxity: normal.\par No scapular winging.\par Sensation is intact distally in the UE.\par Skin is intact in the UE. \par Intact Motor distally.\par  [de-identified] : LEFT facial neuropraxia [de-identified] : \par X-rays of the left shoulder 3 views AP, Y lateral and axillary today showed Slight elevation of the humeral head.  No significant osteoarthritis.  No calcifications.  There are some osteopenia around the greater tuberosity but no distinct bone lesions.  No bone erosion.

## 2022-02-17 ENCOUNTER — OUTPATIENT (OUTPATIENT)
Dept: OUTPATIENT SERVICES | Facility: HOSPITAL | Age: 72
LOS: 1 days | End: 2022-02-17
Payer: COMMERCIAL

## 2022-02-17 ENCOUNTER — APPOINTMENT (OUTPATIENT)
Dept: MRI IMAGING | Facility: HOSPITAL | Age: 72
End: 2022-02-17

## 2022-02-17 DIAGNOSIS — Z98.89 OTHER SPECIFIED POSTPROCEDURAL STATES: Chronic | ICD-10-CM

## 2022-02-17 PROCEDURE — 73221 MRI JOINT UPR EXTREM W/O DYE: CPT | Mod: 26,LT

## 2022-02-17 PROCEDURE — 73221 MRI JOINT UPR EXTREM W/O DYE: CPT

## 2022-03-24 RX ORDER — OXYCODONE 5 MG/1
5 TABLET ORAL
Qty: 56 | Refills: 0 | Status: DISCONTINUED | COMMUNITY
Start: 2020-01-07 | End: 2022-03-24

## 2022-04-26 ENCOUNTER — APPOINTMENT (OUTPATIENT)
Dept: ORTHOPEDIC SURGERY | Facility: AMBULATORY SURGERY CENTER | Age: 72
End: 2022-04-26
Payer: COMMERCIAL

## 2022-04-26 DIAGNOSIS — M75.22 BICIPITAL TENDINITIS, LEFT SHOULDER: ICD-10-CM

## 2022-04-26 DIAGNOSIS — M67.912 UNSPECIFIED DISORDER OF SYNOVIUM AND TENDON, LEFT SHOULDER: ICD-10-CM

## 2022-04-26 DIAGNOSIS — M75.102 UNSPECIFIED ROTATOR CUFF TEAR OR RUPTURE OF LEFT SHOULDER, NOT SPECIFIED AS TRAUMATIC: ICD-10-CM

## 2022-04-26 DIAGNOSIS — M75.42 IMPINGEMENT SYNDROME OF LEFT SHOULDER: ICD-10-CM

## 2022-04-26 PROCEDURE — 99214 OFFICE O/P EST MOD 30 MIN: CPT

## 2022-04-26 NOTE — HISTORY OF PRESENT ILLNESS
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  His pain has been quite severe.\par We had done a steroid injection January 6 which just helped a lot for 3 weeks but no long-term relief.\par He has been doing various physical therapy and resting.  Pain is not getting any better.\par He had a follow-up MRI in February.  He has been prescribed oxycodone's for the pain which he tries to minimize.  Occasionally he will take a Valium.  Recently he started taking some Advil 400 to 800 mg/day which helps somewhat.  He had been off NSAIDs because his creatinine had elevated with the chemotherapy but now was more normal and seems to be okay.  He is not taking Tylenol because of liver issues.\par He had seen another doctor a few weeks ago who did a steroid injection in the glenohumeral joint which did not provide any relief.  This was done under ultrasound guidance.  He did not recommend surgery.

## 2022-04-26 NOTE — PHYSICAL EXAM
[UE] : Sensory: Intact in bilateral upper extremities [Normal RUE] : Right Upper Extremity: No scars, rashes, lesions, ulcers, skin intact [Normal LUE] : Left Upper Extremity: No scars, rashes, lesions, ulcers, skin intact [Normal Touch] : sensation intact for touch [Normal] : Alert and in no acute distress [de-identified] : Left shoulder with right for comparison\par Normal appearance. \par AROM: 145 FE with pain on the left versus 180 degrees on the right, IR to L1 vs T9, 90 abd.\par PROM: 140 FE, 30 ER at the side vs 70, 60 ER and -10 IR vs 60 in the 90 degree abducted position.\par Motor:  5/5  supraspinatus,  5/5 ER, 5/5 IR, 5/5 biceps, 5/5 deltoid.  Normal lift off test\par + Neer,+  Bowen. + X-arm.   + [place]'s, + Speeds.\par Nontender around the left shoulder\par Laxity: normal.\par No scapular winging.\par Sensation is intact distally in the UE.\par Skin is intact in the UE. \par Intact Motor distally.\par  [de-identified] : LEFT facial neuropraxia [de-identified] : \par X-rays of the left shoulder 3 views AP, Y lateral and axillary today showed Slight elevation of the humeral head.  No significant osteoarthritis.  No calcifications.  There are some osteopenia around the greater tuberosity but no distinct bone lesions.  No bone erosion.

## 2022-04-26 NOTE — ASSESSMENT
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  He is having particularly severe pain and difficulty sleeping at night.\par I recommended trying gabapentin 100 mg to start in the evening and could build up to 300 mg and see if this helps with pain and sleeping at night.  He is taking ibuprofen again which seems to be helping as long as his creatinine does not go up.\par He was given home exercises working primarily on the motion as opposed to strengthening.\par We discussed the diagnosis of adhesive capsulitis which is something that usually will resolve over time but could easily take a year or even more to go through all the phases and completely resolved.  In the meantime I would not recommend doing surgery for the partial tears which may or may not even be causing pain.  He has no weakness in the shoulder and the issue really is pain and stiffness at this time.\par He has had a few steroid injections and since the last 1 did not help I would not recommend doing more injections.\par I did give him a prescription for oxycodone which he should use sparingly.  I had suggested no more than 1 a day if needed.  Hopefully he can get relief from the other medication and this condition usually is going to get better over time.\par He will follow-up in about 6 weeks

## 2022-06-01 ENCOUNTER — OUTPATIENT (OUTPATIENT)
Dept: OUTPATIENT SERVICES | Facility: HOSPITAL | Age: 72
LOS: 1 days | End: 2022-06-01
Payer: COMMERCIAL

## 2022-06-01 DIAGNOSIS — Z98.89 OTHER SPECIFIED POSTPROCEDURAL STATES: Chronic | ICD-10-CM

## 2022-06-01 PROCEDURE — 70480 CT ORBIT/EAR/FOSSA W/O DYE: CPT

## 2022-06-01 PROCEDURE — 70480 CT ORBIT/EAR/FOSSA W/O DYE: CPT | Mod: 26

## 2022-06-02 NOTE — HISTORY OF PRESENT ILLNESS
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

## 2022-06-03 ENCOUNTER — APPOINTMENT (OUTPATIENT)
Dept: ORTHOPEDIC SURGERY | Facility: CLINIC | Age: 72
End: 2022-06-03

## 2022-06-07 ENCOUNTER — NON-APPOINTMENT (OUTPATIENT)
Age: 72
End: 2022-06-07

## 2022-06-07 ENCOUNTER — APPOINTMENT (OUTPATIENT)
Dept: OTOLARYNGOLOGY | Facility: CLINIC | Age: 72
End: 2022-06-07
Payer: COMMERCIAL

## 2022-06-07 VITALS
WEIGHT: 210 LBS | HEART RATE: 85 BPM | BODY MASS INDEX: 35.41 KG/M2 | OXYGEN SATURATION: 99 % | SYSTOLIC BLOOD PRESSURE: 169 MMHG | DIASTOLIC BLOOD PRESSURE: 85 MMHG | TEMPERATURE: 97.8 F | HEIGHT: 64.5 IN

## 2022-06-07 DIAGNOSIS — H70.12 CHRONIC MASTOIDITIS, LEFT EAR: ICD-10-CM

## 2022-06-07 DIAGNOSIS — M87.38 OTHER SECONDARY OSTEONECROSIS, OTHER SITE: ICD-10-CM
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
## 2022-06-07 DIAGNOSIS — H90.12 CONDUCTIVE HEARING LOSS, UNILATERAL, LEFT EAR, WITH UNRESTRICTED HEARING ON THE CONTRALATERAL SIDE: ICD-10-CM

## 2022-06-07 PROCEDURE — 99203 OFFICE O/P NEW LOW 30 MIN: CPT | Mod: NC

## 2022-06-07 NOTE — DATA REVIEWED
[de-identified] : ct temporal bones shows l mastoidectomy, cavity opacified, ME partial opacification, no  evidence of mass, bone present over sigmoid sinus and jugular bulb

## 2022-06-07 NOTE — ASSESSMENT
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

## 2022-06-07 NOTE — PHYSICAL EXAM
[de-identified] : r nl ; l examined under microscope - some yellow necrotic debris suctioned, cultured, l visible mastoid air cells c/w orn [FreeTextEntry2] : l good resting tone [de-identified] : gait steady

## 2022-06-07 NOTE — HISTORY OF PRESENT ILLNESS
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 Told dz is stable and immunotherapy is working. liver mets are gone had radioembo and ablations, but his left ear has not been examined for 2 yrs. It was in the radiation field he reports mastoidectomy was necessary as tumor abutted it. He said bx showed facial n was clear at surgery. 1 week ago had bleeding from l ear (all over his shirt); he put toilet paper in his ear stopped. The next d he went to Dr Alfonso Joshi; he said he looked at ear and told him infx and purulence and tm perf can see ossicular necrosis had ct and set up to see Dr Clement at [facility] (where he is being managed) and told to see otologist. He reports that since he has been on ciprodex, clotrimazole drops and po Keflex he is improving.

## 2022-06-13 LAB — BACTERIA FLD CULT: ABNORMAL

## 2022-06-14 ENCOUNTER — APPOINTMENT (OUTPATIENT)
Dept: OTOLARYNGOLOGY | Facility: CLINIC | Age: 72
End: 2022-06-14

## 2022-08-31 ENCOUNTER — RESULT REVIEW (OUTPATIENT)
Age: 72
End: 2022-08-31

## 2022-08-31 ENCOUNTER — OUTPATIENT (OUTPATIENT)
Dept: OUTPATIENT SERVICES | Facility: HOSPITAL | Age: 72
LOS: 1 days | End: 2022-08-31
Payer: COMMERCIAL

## 2022-08-31 DIAGNOSIS — Z98.89 OTHER SPECIFIED POSTPROCEDURAL STATES: Chronic | ICD-10-CM

## 2022-08-31 PROCEDURE — 72020 X-RAY EXAM OF SPINE 1 VIEW: CPT

## 2022-08-31 PROCEDURE — 73502 X-RAY EXAM HIP UNI 2-3 VIEWS: CPT | Mod: 26,LT

## 2022-08-31 PROCEDURE — 73502 X-RAY EXAM HIP UNI 2-3 VIEWS: CPT

## 2022-08-31 PROCEDURE — 72020 X-RAY EXAM OF SPINE 1 VIEW: CPT | Mod: 26

## 2022-11-17 ENCOUNTER — NON-APPOINTMENT (OUTPATIENT)
Age: 72
End: 2022-11-17

## 2022-11-17 ENCOUNTER — APPOINTMENT (OUTPATIENT)
Dept: HEART AND VASCULAR | Facility: CLINIC | Age: 72
End: 2022-11-17

## 2022-11-17 VITALS
DIASTOLIC BLOOD PRESSURE: 107 MMHG | HEIGHT: 76 IN | OXYGEN SATURATION: 97 % | BODY MASS INDEX: 26.55 KG/M2 | SYSTOLIC BLOOD PRESSURE: 172 MMHG | HEART RATE: 79 BPM | WEIGHT: 218 LBS

## 2022-11-17 DIAGNOSIS — I10 ESSENTIAL (PRIMARY) HYPERTENSION: ICD-10-CM

## 2022-11-17 PROCEDURE — 93000 ELECTROCARDIOGRAM COMPLETE: CPT

## 2022-11-17 PROCEDURE — 99214 OFFICE O/P EST MOD 30 MIN: CPT

## 2022-11-17 RX ORDER — LOSARTAN POTASSIUM 25 MG/1
25 TABLET, FILM COATED ORAL DAILY
Qty: 30 | Refills: 11 | Status: DISCONTINUED | COMMUNITY
Start: 2018-11-26 | End: 2022-11-17

## 2022-11-17 RX ORDER — LOSARTAN POTASSIUM 25 MG/1
25 TABLET, FILM COATED ORAL TWICE DAILY
Qty: 120 | Refills: 3 | Status: DISCONTINUED | COMMUNITY
Start: 2022-02-09 | End: 2022-11-17

## 2022-11-17 NOTE — DISCUSSION/SUMMARY
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 Return in 2 weeks for follow-up. Agree with him continuing to use smaller doses of NSAIDs as he needs it for his shoulder pain. He will return for short visit with our NP Susy on 11/29.\par \par HLD- Will inquire on next visit as we address his blood pressure first this visit. \par \par \par \par

## 2022-11-17 NOTE — HISTORY OF PRESENT ILLNESS
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 He has been having shoulder pain as well as being on immunotherapy that he feels are potentially effecting BP and he is taking NSAIDs which may be contributing.  \par \par With his treatments, his creatinine increased from 1.1 to 1.5. He stopped losartan and was on no meds, but his home pressure was now 150's/80's. Creatinine is now 1.1. \par \par He is now back on 1 200 mg tablet of NSAIDS twice daily. He still notes BP to be elevated. \par \par Prior History:\par In 2014, he developed bilateral PNA and ultimately underwent CT scan as well as PET- CT scan which demonstrated the development of a progressively slow growling right upper lobe nodule which had increased SUV activity=12. He is s/p right VATS, robotic assist anterior segmentectomy, wedge resection, right upper lobe.\par \par He had initially developed left jaw pain ~11/2017 with pain in fingers, which he attributed to his psoriatic arthritis, and resolved with adalimumab. However, his symptoms persisted. Had MRI in 7/2018 that showed parotid mass with perineural spread. On 8/17/18, he underwent left parotidectomy, mastoidectomy, facial nerve sacrifice, ALT free flap reconstruction, cable graft, facial sling. He had positive L cervical LNs (12/52 per Dr. Alberto), clean surgical margins and no distant mets. He was treated with two doses of cisplatin – last 9/26 – was d/ace b/o JEET (Cr 1.7 per Dr. Alberto). He also received RT starting end of 9/2018.\par \par In 12/2019 PET CT and MR discovered liver disease which was biopsied at [facility] and consistent with metastatic disease of his parotid primary tumor.\par \par Seen in consultation for possible local regional therapy with Yttrium 90 radioembolization.\par \par He goes to [place] every 3 weeks for immunotherapy w/ Keytruda/pembrolizumab. \par \par He was taken off the rituximab about a year and a half ago 2/2 to concerns over COVID pandemic as well as interfering w/ the pembrolizumab treatment after liver mets were discovered. \par \par TTE 2014 - RV and LV normal size and function; LVEF 60-65%; no significant valvular disease \par \par He has family h/o of HTN in both his parents, but no heart disease or stroke.\par \par He reports having well-controlled lipids his whole life (LDL was 79 in 2018). \par

## 2022-11-29 ENCOUNTER — APPOINTMENT (OUTPATIENT)
Dept: HEART AND VASCULAR | Facility: CLINIC | Age: 72
End: 2022-11-29

## 2022-11-29 VITALS
HEIGHT: 76 IN | TEMPERATURE: 97.8 F | OXYGEN SATURATION: 97 % | SYSTOLIC BLOOD PRESSURE: 160 MMHG | HEART RATE: 81 BPM | DIASTOLIC BLOOD PRESSURE: 90 MMHG | WEIGHT: 218 LBS | BODY MASS INDEX: 26.55 KG/M2

## 2022-11-29 PROCEDURE — 99213 OFFICE O/P EST LOW 20 MIN: CPT

## 2022-11-30 NOTE — DISCUSSION/SUMMARY
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) on rituximab, recurrent pneumonia s/p RUL segmentectomy (granuloma), recurrent/metastatic lymphoepithelial carcinoma (dx 2018) presents today for follow-up of HTN.\par \par HTN- improved; elevated in office and on recheck, but some element of white coat is likely as he is better controlled on home monitor \par - c/w amlodipine 5 mg daily as he has had a significant improvement in just a week and half of treatment; can increase to 10mg if needed or consider addition of diuretic \par - pt would prefer to avoid ACE/ARB \par - Agree with him continuing to use smaller doses of NSAIDs as he needs it for his shoulder pain. \par \par HLD- defer lipid panel at this time; can obtain in the future or have added on to routine labs \par \par Will f/u in 3-4 weeks or call sooner if needed.

## 2022-11-30 NOTE — HISTORY OF PRESENT ILLNESS
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 He is tolerating it well. Had some constipation for a few days after starting it, but feels that has resolved now. His BP was up to 190-200/100 on home monitor when he was off all meds after stopping the losartan when Cr increased 1.5. Now he has noticed an improvement and readings are ranging in the 130s-140s sytolic and 70-80 diastolic. He is not checking his BP daily, but every 2-3 days. It was 140/75 yesterday evening. He takes the amlodipine around 5pm. He reports still elevated at doctor's appt. Cr has returned to baseline of 1.1-1.2. \par \par He is taking as low a dose as possible of NSAIDs to treat his should pain and also feels it helps him tolerate the immunotherapy better.  \par \par He has upcoming PET scan at [place]. \par \par \par Prior History:\par In 2014, he developed bilateral PNA and ultimately underwent CT scan as well as PET- CT scan which demonstrated the development of a progressively slow growling right upper lobe nodule which had increased SUV activity=12. He is s/p right VATS, robotic assist anterior segmentectomy, wedge resection, right upper lobe.\par \par He had initially developed left jaw pain ~11/2017 with pain in fingers, which he attributed to his psoriatic arthritis, and resolved with adalimumab. However, his symptoms persisted. Had MRI in 7/2018 that showed parotid mass with perineural spread. On 8/17/18, he underwent left parotidectomy, mastoidectomy, facial nerve sacrifice, ALT free flap reconstruction, cable graft, facial sling. He had positive L cervical LNs (12/52 per Dr. Alberto), clean surgical margins and no distant mets. He was treated with two doses of cisplatin – last 9/26 – was d/ace b/o JEET (Cr 1.7 per Dr. Alberto). He also received RT starting end of 9/2018.\par \par In 12/2019 PET CT and MR discovered liver disease which was biopsied at [facility] and consistent with metastatic disease of his parotid primary tumor.\par \par Seen in consultation for possible local regional therapy with Yttrium 90 radioembolization.\par \par He goes to [place] every 3 weeks for immunotherapy w/ Keytruda/pembrolizumab. \par \par He was taken off the rituximab about a year and a half ago 2/2 to concerns over COVID pandemic as well as interfering w/ the pembrolizumab treatment after liver mets were discovered. \par \par TTE 2014 - RV and LV normal size and function; LVEF 60-65%; no significant valvular disease \par \par He has family h/o of HTN in both his parents, but no heart disease or stroke.\par \par He reports having well-controlled lipids his whole life (LDL was 79 in 2018). \par

## 2023-01-20 ENCOUNTER — APPOINTMENT (OUTPATIENT)
Dept: HEART AND VASCULAR | Facility: CLINIC | Age: 73
End: 2023-01-20
Payer: COMMERCIAL

## 2023-01-20 VITALS
BODY MASS INDEX: 26.18 KG/M2 | HEART RATE: 88 BPM | OXYGEN SATURATION: 98 % | HEIGHT: 76 IN | TEMPERATURE: 98.1 F | WEIGHT: 215 LBS | DIASTOLIC BLOOD PRESSURE: 87 MMHG | SYSTOLIC BLOOD PRESSURE: 166 MMHG

## 2023-01-20 PROCEDURE — 99214 OFFICE O/P EST MOD 30 MIN: CPT

## 2023-01-21 RX ORDER — EPINEPHRINE 0.3 MG/.3ML
0.3 INJECTION INTRAMUSCULAR
Qty: 2 | Refills: 5 | Status: DISCONTINUED | COMMUNITY
Start: 2021-10-26 | End: 2023-01-21

## 2023-01-21 RX ORDER — OXYCODONE 5 MG/1
5 TABLET ORAL
Qty: 64 | Refills: 0 | Status: DISCONTINUED | COMMUNITY
Start: 2022-01-05 | End: 2023-01-21

## 2023-01-21 RX ORDER — GABAPENTIN 100 MG/1
100 CAPSULE ORAL
Qty: 50 | Refills: 1 | Status: DISCONTINUED | COMMUNITY
Start: 2022-04-26 | End: 2023-01-21

## 2023-01-21 RX ORDER — RITUXIMAB 10 MG/ML
INJECTION, SOLUTION INTRAVENOUS
Refills: 0 | Status: DISCONTINUED | COMMUNITY
End: 2023-01-21

## 2023-01-21 RX ORDER — OXYCODONE 5 MG/1
5 TABLET ORAL
Qty: 30 | Refills: 0 | Status: DISCONTINUED | COMMUNITY
Start: 2022-04-26 | End: 2023-01-21

## 2023-01-21 NOTE — DISCUSSION/SUMMARY
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 Puente's team to clarify the full range of his blood pressures. It appears that his blood pressure are almost uniformly not at goal. Now that his creatinine is improved off NSAIDs, we discussed starting losartan 25 mg again with amlodipine as that combo should improve BP's more and preserve renal function. \par \par HLD- he is not as interested in starting a statin at this time. Will obtain recent lipids from [place] or measure on next visit if none drawn. \par \par Will likely have telehealth with Susy after labs or short visit. \par \par

## 2023-01-21 NOTE — HISTORY OF PRESENT ILLNESS
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 \par He has also stopped drinking and he is feeling better overall. \par BP"s at home are between 140's and 160's. He usually waits for a little and numbers tend to improve. \par He is taking amlodipine alone at this time. Has been off losartan, but his creatinine has improved off NSAIDs. Recent labs with creatinine of 1.1 at [place]. \par \par Prior History:\par In 2014, he developed bilateral PNA and ultimately underwent CT scan as well as PET- CT scan which demonstrated the development of a progressively slow growling right upper lobe nodule which had increased SUV activity=12. He is s/p right VATS, robotic assist anterior segmentectomy, wedge resection, right upper lobe.\par \par He had initially developed left jaw pain ~11/2017 with pain in fingers, which he attributed to his psoriatic arthritis, and resolved with adalimumab. However, his symptoms persisted. Had MRI in 7/2018 that showed parotid mass with perineural spread. On 8/17/18, he underwent left parotidectomy, mastoidectomy, facial nerve sacrifice, ALT free flap reconstruction, cable graft, facial sling. He had positive L cervical LNs (12/52 per Dr. Alberto), clean surgical margins and no distant mets. He was treated with two doses of cisplatin – last 9/26 – was d/ace b/o JEET (Cr 1.7 per Dr. Alberto). He also received RT starting end of 9/2018.\par \par In 12/2019 PET CT and MR discovered liver disease which was biopsied at [facility] and consistent with metastatic disease of his parotid primary tumor.\par \par Seen in consultation for possible local regional therapy with Yttrium 90 radioembolization.\par \par He goes to [place] every 3 weeks for immunotherapy w/ Keytruda/pembrolizumab. \par \par He was taken off the rituximab about a year and a half ago 2/2 to concerns over COVID pandemic as well as interfering w/ the pembrolizumab treatment after liver mets were discovered. \par \par TTE 2014 - RV and LV normal size and function; LVEF 60-65%; no significant valvular disease \par \par He has family h/o of HTN in both his parents, but no heart disease or stroke.\par \par He reports having well-controlled lipids his whole life (LDL was 79 in 2018). \par

## 2023-01-26 ENCOUNTER — APPOINTMENT (OUTPATIENT)
Dept: NEPHROLOGY | Facility: CLINIC | Age: 73
End: 2023-01-26
Payer: COMMERCIAL

## 2023-01-26 VITALS — HEART RATE: 77 BPM | DIASTOLIC BLOOD PRESSURE: 80 MMHG | SYSTOLIC BLOOD PRESSURE: 154 MMHG

## 2023-01-26 DIAGNOSIS — I10 ESSENTIAL (PRIMARY) HYPERTENSION: ICD-10-CM

## 2023-01-26 PROCEDURE — 93784 AMBL BP MNTR W/SOFTWARE: CPT

## 2023-01-26 NOTE — PROCEDURE
[FreeTextEntry1] : Placed ABPM cuff on left upper arm. Gave instructions for device function and proper fit.\par approx sleep period: 11p - 7a\par current BP meds: amlodipine 5mg daily

## 2023-01-27 RX ORDER — LOSARTAN POTASSIUM 25 MG/1
25 TABLET, FILM COATED ORAL DAILY
Qty: 90 | Refills: 3 | Status: ACTIVE | COMMUNITY
Start: 2023-01-27 | End: 1900-01-01

## 2023-05-24 ENCOUNTER — OUTPATIENT (OUTPATIENT)
Dept: OUTPATIENT SERVICES | Facility: HOSPITAL | Age: 73
LOS: 1 days | End: 2023-05-24
Payer: COMMERCIAL

## 2023-05-24 DIAGNOSIS — I65.29 OCCLUSION AND STENOSIS OF UNSPECIFIED CAROTID ARTERY: ICD-10-CM

## 2023-05-24 DIAGNOSIS — Z98.89 OTHER SPECIFIED POSTPROCEDURAL STATES: Chronic | ICD-10-CM

## 2023-05-24 PROCEDURE — 93880 EXTRACRANIAL BILAT STUDY: CPT

## 2023-05-24 PROCEDURE — 93880 EXTRACRANIAL BILAT STUDY: CPT | Mod: 26

## 2023-06-26 ENCOUNTER — OUTPATIENT (OUTPATIENT)
Dept: OUTPATIENT SERVICES | Facility: HOSPITAL | Age: 73
LOS: 1 days | End: 2023-06-26
Payer: COMMERCIAL

## 2023-06-26 DIAGNOSIS — Z98.89 OTHER SPECIFIED POSTPROCEDURAL STATES: Chronic | ICD-10-CM

## 2023-06-26 PROCEDURE — 70480 CT ORBIT/EAR/FOSSA W/O DYE: CPT

## 2023-06-26 PROCEDURE — 70480 CT ORBIT/EAR/FOSSA W/O DYE: CPT | Mod: 26

## 2023-08-02 ENCOUNTER — APPOINTMENT (OUTPATIENT)
Dept: ORTHOPEDIC SURGERY | Facility: CLINIC | Age: 73
End: 2023-08-02
Payer: COMMERCIAL

## 2023-08-02 DIAGNOSIS — M70.22 OLECRANON BURSITIS, LEFT ELBOW: ICD-10-CM

## 2023-08-02 DIAGNOSIS — M75.02 ADHESIVE CAPSULITIS OF LEFT SHOULDER: ICD-10-CM

## 2023-08-02 PROCEDURE — 99213 OFFICE O/P EST LOW 20 MIN: CPT

## 2023-08-02 NOTE — PHYSICAL EXAM
Nakul Hugger warming device placed on patient   [UE] : Sensory: Intact in bilateral upper extremities [Normal RUE] : Right Upper Extremity: No scars, rashes, lesions, ulcers, skin intact [Normal LUE] : Left Upper Extremity: No scars, rashes, lesions, ulcers, skin intact [Normal Touch] : sensation intact for touch [Normal] : Oriented to person, place, and time, insight and judgement were intact and the affect was normal [de-identified] : Left elbow Moderate swelling in the olecranon bursa.  No erythema or ecchymoses. Elbow range of motion 0 to 145 degrees flexion and 90 degrees pronation supination without pain or limitation. 5/5 biceps and triceps and . Normal motor and sensory exam distally. Normal elbow stability. Left shoulder forward elevation is to about 170 degrees and internal rotation to T9.  Discomfort with Neer and Bowen.  5/5 supraspinatus, internal rotation, external rotation

## 2023-08-02 NOTE — HISTORY OF PRESENT ILLNESS
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  About 10 days ago he was in bed and went to get up and pushed elbow into the bed and next days woke up to a swollen elbow.   I last saw him for his LEFT shoulder. He had gone to physical therapy. He takes 1. 200mg Advil daily.  It is doing much better than it had been but he still has some stiffness and mild pain and takes the Advil which helps

## 2023-08-02 NOTE — ASSESSMENT
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  We talked about the options including observation versus aspiration.  There is a small risk of infection with aspiration.  It could just go away on its own.  Because he has had cancer and is on medication and may be at increased risk of infection and because he is going away to [place] in the next week or so he would prefer not to risk infection at all and we decided to just wait and see.  Next week if it is getting worse and we could always consider an aspiration at that time.  Otherwise when he gets back from [place] if its not getting any better he may come in for an aspiration. If it gets red hot and painful then that is a sign of infection which would need immediate treatment. His shoulder has gotten a lot better since I last saw him but still there is residual stiffness.  He should continue to work on the motion and stretching it out. Follow-up as needed

## 2023-12-06 ENCOUNTER — OUTPATIENT (OUTPATIENT)
Dept: OUTPATIENT SERVICES | Facility: HOSPITAL | Age: 73
LOS: 1 days | End: 2023-12-06
Payer: COMMERCIAL

## 2023-12-06 ENCOUNTER — APPOINTMENT (OUTPATIENT)
Dept: MRI IMAGING | Facility: HOSPITAL | Age: 73
End: 2023-12-06

## 2023-12-06 DIAGNOSIS — Z98.89 OTHER SPECIFIED POSTPROCEDURAL STATES: Chronic | ICD-10-CM

## 2023-12-06 PROCEDURE — A9585: CPT

## 2023-12-06 PROCEDURE — 70542 MRI ORBIT/FACE/NECK W/DYE: CPT | Mod: 26

## 2023-12-06 PROCEDURE — A9579: CPT

## 2023-12-06 PROCEDURE — 70542 MRI ORBIT/FACE/NECK W/DYE: CPT

## 2023-12-12 ENCOUNTER — APPOINTMENT (OUTPATIENT)
Dept: OTOLARYNGOLOGY | Facility: CLINIC | Age: 73
End: 2023-12-12
Payer: COMMERCIAL

## 2023-12-12 VITALS
BODY MASS INDEX: 26.18 KG/M2 | DIASTOLIC BLOOD PRESSURE: 83 MMHG | WEIGHT: 215 LBS | HEIGHT: 76 IN | TEMPERATURE: 97.3 F | HEART RATE: 79 BPM | SYSTOLIC BLOOD PRESSURE: 137 MMHG | OXYGEN SATURATION: 98 %

## 2023-12-12 DIAGNOSIS — C07 MALIGNANT NEOPLASM OF PAROTID GLAND: ICD-10-CM

## 2023-12-12 PROCEDURE — 99203 OFFICE O/P NEW LOW 30 MIN: CPT

## 2023-12-15 NOTE — PHYSICAL EXAM
[de-identified] : Left neck dissection  [de-identified] : Left parotidectomy, sacrifice CN VIII, mild asymmetry of eyelid, face as expected [de-identified] : Good mouth opening [de-identified] : RIght normal, Left post surgical [de-identified] : Left canal difficult to visualize, vertical orientation, no significant drainage today. 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

## 2023-12-15 NOTE — HISTORY OF PRESENT ILLNESS
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 He was managed at [facility] by Dr. Aragon and I'm familiar with his case by our conversations. He wanted to have followed with the head neck surgeon as he was experiencing some right, sided facial pain in the jaw and had not had recent imaging and therefore we obtain an MRI at a neck soft tissue with contrast for review and he presented today for an examination of discussion. He is experiencing late radiation changes on the left side of his face with some altered sensation and fibrosis. He has had a good response to systemic therapy and overall doing relatively well.

## 2023-12-15 NOTE — DATA REVIEWED
[de-identified] : MRI reviewed, other than post treatment changes, no concerning findings on my read of the images, reviewed with Dr. Alberto.

## 2023-12-15 NOTE — ADDENDUM
[FreeTextEntry1] : Speech Language Pathology. Pt seen during clinic visit in conjunction with Dr. Melendez. 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 Pt may benefit from myofascial release therapy. Contact information to be provided to Pt. All questions were answered.   Kathy Villa M.S. CCC-SLP

## 2023-12-15 NOTE — REVIEW OF SYSTEMS
[Hearing Loss] : hearing loss [Ear Drainage] : ear drainage [As Noted in HPI] : as noted in HPI [Negative] : Heme/Lymph [de-identified] : Left surgical sequela  [de-identified] : Fibrosis and stiffening of left side, right sided pain [de-identified] : Mild dryness of mucosa but no other complaints other than it feels different on that side of the mouth [FreeTextEntry4] : Fibrosis and stiffening of left side, right sided pain

## 2024-04-29 ENCOUNTER — RX RENEWAL (OUTPATIENT)
Age: 74
End: 2024-04-29

## 2024-04-29 RX ORDER — AMLODIPINE BESYLATE 5 MG/1
5 TABLET ORAL DAILY
Qty: 90 | Refills: 1 | Status: ACTIVE | COMMUNITY
Start: 2022-11-17 | End: 1900-01-01

## 2025-03-11 ENCOUNTER — APPOINTMENT (OUTPATIENT)
Dept: INTERNAL MEDICINE | Facility: CLINIC | Age: 75
End: 2025-03-11
Payer: COMMERCIAL

## 2025-03-11 ENCOUNTER — NON-APPOINTMENT (OUTPATIENT)
Age: 75
End: 2025-03-11

## 2025-03-11 VITALS
TEMPERATURE: 98.1 F | BODY MASS INDEX: 26.03 KG/M2 | OXYGEN SATURATION: 98 % | DIASTOLIC BLOOD PRESSURE: 77 MMHG | HEART RATE: 82 BPM | HEIGHT: 76.5 IN | SYSTOLIC BLOOD PRESSURE: 149 MMHG | WEIGHT: 216 LBS

## 2025-03-11 DIAGNOSIS — I10 ESSENTIAL (PRIMARY) HYPERTENSION: ICD-10-CM

## 2025-03-11 DIAGNOSIS — Z85.89 PERSONAL HISTORY OF MALIGNANT NEOPLASM OF OTHER ORGANS AND SYSTEMS: ICD-10-CM

## 2025-03-11 DIAGNOSIS — C08.9 MALIGNANT NEOPLASM OF MAJOR SALIVARY GLAND, UNSPECIFIED: ICD-10-CM

## 2025-03-11 DIAGNOSIS — J40 BRONCHITIS, NOT SPECIFIED AS ACUTE OR CHRONIC: ICD-10-CM

## 2025-03-11 PROCEDURE — 99204 OFFICE O/P NEW MOD 45 MIN: CPT

## 2025-03-11 RX ORDER — AMOXICILLIN AND CLAVULANATE POTASSIUM 500; 125 MG/1; MG/1
500-125 TABLET, FILM COATED ORAL
Qty: 14 | Refills: 1 | Status: ACTIVE | COMMUNITY
Start: 2025-03-11 | End: 1900-01-01

## 2025-03-11 RX ORDER — PEMBROLIZUMAB 25 MG/ML
INJECTION, SOLUTION INTRAVENOUS
Refills: 0 | Status: ACTIVE | COMMUNITY

## 2025-05-30 ENCOUNTER — APPOINTMENT (OUTPATIENT)
Dept: INTERNAL MEDICINE | Facility: CLINIC | Age: 75
End: 2025-05-30
Payer: COMMERCIAL

## 2025-05-30 VITALS
SYSTOLIC BLOOD PRESSURE: 163 MMHG | WEIGHT: 212 LBS | OXYGEN SATURATION: 97 % | DIASTOLIC BLOOD PRESSURE: 84 MMHG | HEART RATE: 76 BPM | TEMPERATURE: 98.1 F | BODY MASS INDEX: 25.82 KG/M2 | HEIGHT: 76 IN

## 2025-05-30 VITALS — DIASTOLIC BLOOD PRESSURE: 80 MMHG | SYSTOLIC BLOOD PRESSURE: 130 MMHG

## 2025-05-30 DIAGNOSIS — I10 ESSENTIAL (PRIMARY) HYPERTENSION: ICD-10-CM

## 2025-05-30 PROCEDURE — 99213 OFFICE O/P EST LOW 20 MIN: CPT

## 2025-07-06 NOTE — PHYSICAL EXAM
[UE] : Sensory: Intact in bilateral upper extremities [Normal RUE] : Right Upper Extremity: No scars, rashes, lesions, ulcers, skin intact [Normal LUE] : Left Upper Extremity: No scars, rashes, lesions, ulcers, skin intact [Normal Touch] : sensation intact for touch [Normal] : Oriented to person, place, and time, insight and judgement were intact and the affect was normal [de-identified] : \par \par X-rays of LEFT shoulder 3 views on 4/28/22 showed slight elevation of the humeral head. No signifcant OA. No calcifications. There are some osteopenia around the greater tuberosity but no distinct bone lesions. No bone erosion.  Attending Attestation (For Attendings USE Only)...